# Patient Record
Sex: FEMALE | HISPANIC OR LATINO | Employment: UNEMPLOYED | URBAN - METROPOLITAN AREA
[De-identification: names, ages, dates, MRNs, and addresses within clinical notes are randomized per-mention and may not be internally consistent; named-entity substitution may affect disease eponyms.]

---

## 2019-08-22 ENCOUNTER — OFFICE VISIT (OUTPATIENT)
Dept: OBGYN | Facility: OTHER | Age: 44
End: 2019-08-22
Payer: COMMERCIAL

## 2019-08-22 VITALS
HEIGHT: 63 IN | WEIGHT: 140 LBS | HEART RATE: 64 BPM | DIASTOLIC BLOOD PRESSURE: 60 MMHG | BODY MASS INDEX: 24.8 KG/M2 | SYSTOLIC BLOOD PRESSURE: 110 MMHG

## 2019-08-22 DIAGNOSIS — Z80.49 FAMILY HISTORY OF MALIGNANT NEOPLASM OF ENDOMETRIUM: ICD-10-CM

## 2019-08-22 DIAGNOSIS — Z01.419 WELL FEMALE EXAM WITH ROUTINE GYNECOLOGICAL EXAM: Primary | ICD-10-CM

## 2019-08-22 DIAGNOSIS — Z12.4 ENCOUNTER FOR SCREENING FOR CERVICAL CANCER: ICD-10-CM

## 2019-08-22 DIAGNOSIS — Z12.31 VISIT FOR SCREENING MAMMOGRAM: ICD-10-CM

## 2019-08-22 PROCEDURE — 87624 HPV HI-RISK TYP POOLED RSLT: CPT | Performed by: OBSTETRICS & GYNECOLOGY

## 2019-08-22 PROCEDURE — 99386 PREV VISIT NEW AGE 40-64: CPT | Performed by: OBSTETRICS & GYNECOLOGY

## 2019-08-22 PROCEDURE — G0123 SCREEN CERV/VAG THIN LAYER: HCPCS | Performed by: OBSTETRICS & GYNECOLOGY

## 2019-08-22 ASSESSMENT — MIFFLIN-ST. JEOR: SCORE: 1267.17

## 2019-08-22 NOTE — PROGRESS NOTES
SUBJECTIVE:   CC: Kacie Chung is an 44 year old woman who presents for preventive health visit.     Healthy Habits:    Getting at least 3 servings of Calcium per day:  NO    Bi-annual eye exam:  Yes    Dental care twice a year:  Yes    Sleep apnea or symptoms of sleep apnea:  None    Diet:  Regular (no restrictions)    Frequency of exercise:  None    Duration of exercise:  N/A    Taking medications regularly:  Not Applicable    Barriers to taking medications:  Not applicable    Medication side effects:  Not applicable    PHQ-2 Total Score:    Additional concerns today:  Yes    Mammogram done on this date: 9/2018 (approximately), by this group: in [place], results were normal per patient.   Pap smear done on this date: 8/2018 (approximately), by this group: in [place], results were normal per patient.       Last menstrual period was irregular.  This was an isolate event.      Cholesterol testing was normal this year.     No history of GDM or GHTN.     Condoms for contraception     Mom with history of endometrial cancer diagnosed at age 66,      Today's PHQ-2 Score:   PHQ-2 ( 1999 Pfizer) 8/22/2019   Q1: Little interest or pleasure in doing things 0   Q2: Feeling down, depressed or hopeless 0   PHQ-2 Score 0   PHQ-2 Score Incomplete       Abuse: Current or Past(Physical, Sexual or Emotional)- No  Do you feel safe in your environment? Yes    Social History     Tobacco Use     Smoking status: Never Smoker     Smokeless tobacco: Never Used   Substance Use Topics     Alcohol use: Not Currently     If you drink alcohol do you typically have >3 drinks per day or >7 drinks per week? No    No flowsheet data found.    Labs reviewed in EPIC  BP Readings from Last 3 Encounters:   08/22/19 110/60    Wt Readings from Last 3 Encounters:   08/22/19 63.5 kg (140 lb)                  Patient Active Problem List   Diagnosis     Family history of malignant neoplasm of endometrium     Past Surgical History:   Procedure Laterality Date  "     SECTION         Social History     Tobacco Use     Smoking status: Never Smoker     Smokeless tobacco: Never Used   Substance Use Topics     Alcohol use: Not Currently     Family History   Problem Relation Age of Onset     Endometrial Cancer Mother      Hypertension Mother      Breast Cancer Maternal Grandmother          No current outpatient medications on file.     No Known Allergies  No lab results found.     Mammogram Screening: Patient under age 50, mutual decision reflected in health maintenance.    History of abnormal Pap smear: NO - age 30-65 PAP every 5 years with negative HPV co-testing recommended     Review of Systems  CONSTITUTIONAL: NEGATIVE for fever, chills, change in weight  INTEGUMENTARU/SKIN: NEGATIVE for worrisome rashes, moles or lesions  EYES: NEGATIVE for vision changes or irritation  ENT: NEGATIVE for ear, mouth and throat problems  RESP: NEGATIVE for significant cough or SOB  BREAST: NEGATIVE for masses, tenderness or discharge  CV: NEGATIVE for chest pain, palpitations or peripheral edema  GI: NEGATIVE for nausea, abdominal pain, heartburn, or change in bowel habits  : NEGATIVE for unusual urinary or vaginal symptoms. Periods are regular, aside from this recent one   MUSCULOSKELETAL: NEGATIVE for significant arthralgias or myalgia  NEURO: NEGATIVE for weakness, dizziness or paresthesias  PSYCHIATRIC: NEGATIVE for changes in mood or affect     OBJECTIVE:   /60 (BP Location: Right arm, Patient Position: Chair, Cuff Size: Adult Regular)   Pulse 64   Ht 1.605 m (5' 3.19\")   Wt 63.5 kg (140 lb)   LMP 08/15/2019   BMI 24.65 kg/m    Physical Exam  Gen: Alert and oriented times 3, no acute distress.  Well developed, well nourished, pleasant.    Neck: Supple, no masses.  No thyromegaly.  Breast: Symmetrical without lesions.  No dimpling, nipple discharge, or discrete masses.  No lymphadenopathy.  Chest:  Non labored.  Clear to auscultation bilaterally.    Heart: Regular, " "normal S1, S2.  No murmurs.   Abdomen: Soft, nontender, nondistended.  No hepatosplenomegaly.    :  Normal female external genitalia.  No lesions.  Urethral meatus normal.  Speculum exam reveals a normal vaginal vault, normal cervix .  No abnormal discharge.  Bimanual exam reveals a normal, mobile, nontender uterus .  No cervical motion tenderness.  Adnexa nontender with no palpable masses.    Extremities:  Nontender, no edema.    Pap obtained:  Yes     ASSESSMENT/PLAN:       ICD-10-CM    1. Well female exam with routine gynecological exam Z01.419    2. Visit for screening mammogram Z12.31 *MA Screening Digital Bilateral   3. Encounter for screening for cervical cancer  Z12.4    4. Family history of malignant neoplasm of endometrium Z80.49        Plan Pap today, then if normal, routine screening.      COUNSELING:  Reviewed preventive health counseling, as reflected in patient instructions    Estimated body mass index is 24.65 kg/m  as calculated from the following:    Height as of this encounter: 1.605 m (5' 3.19\").    Weight as of this encounter: 63.5 kg (140 lb).         reports that she has never smoked. She has never used smokeless tobacco.      Counseling Resources:  ATP IV Guidelines  Pooled Cohorts Equation Calculator  Breast Cancer Risk Calculator  FRAX Risk Assessment  ICSI Preventive Guidelines  Dietary Guidelines for Americans, 2010  USDA's MyPlate  ASA Prophylaxis  Lung CA Screening    Adri Galvin MD  [facility]  "

## 2019-08-22 NOTE — NURSING NOTE
"Chief Complaint   Patient presents with     Physical       Initial /60 (BP Location: Right arm, Patient Position: Chair, Cuff Size: Adult Regular)   Pulse 64   Ht 1.605 m (5' 3.19\")   Wt 63.5 kg (140 lb)   LMP 08/15/2019   BMI 24.65 kg/m   Estimated body mass index is 24.65 kg/m  as calculated from the following:    Height as of this encounter: 1.605 m (5' 3.19\").    Weight as of this encounter: 63.5 kg (140 lb).  BP completed using cuff size: regular    No obstetric history on file.    The following HM Due: NONE      The following patient reported/Care Every where data was sent to:  P ABSTRACT QUALITY INITIATIVES [[id number]]  Mammogram done on this date: 9/2018 (approximately), by this group: in [place], results were NIL.   Pap smear done on this date: 8/2018 (approximately), by this group: in [place], results were NIL.      n/a       Cheyanne Borrero, [facility]  August 22, 2019    "

## 2019-08-22 NOTE — LETTER
September 4, 2019    Kacie Chung  [address]   [place] [address]    Dear ,  This letter is regarding your recent Pap smear (cervical cancer screening) and Human Papillomavirus (HPV) test.  We are happy to inform you that your Pap smear result is normal. Cervical cancer is closely linked with certain types of HPV. Your results showed no evidence of high-risk HPV.  We recommend you have your next PAP smear and HPV test in 5 years.  You will still need to return to the clinic every year for an annual exam and other preventive tests.  If you have additional questions regarding this result, please call our registered nurse, Evangelina at [phone].  Sincerely,    Adri Galvin MD/cesar

## 2019-08-29 LAB
FINAL DIAGNOSIS: NORMAL
HPV HR 12 DNA CVX QL NAA+PROBE: NEGATIVE
HPV16 DNA SPEC QL NAA+PROBE: NEGATIVE
HPV18 DNA SPEC QL NAA+PROBE: NEGATIVE
SPECIMEN DESCRIPTION: NORMAL
SPECIMEN SOURCE CVX/VAG CYTO: NORMAL

## 2019-08-30 LAB
COPATH REPORT: NORMAL
PAP: NORMAL

## 2019-09-19 ENCOUNTER — ANCILLARY PROCEDURE (OUTPATIENT)
Dept: MAMMOGRAPHY | Facility: OTHER | Age: 44
End: 2019-09-19
Attending: OBSTETRICS & GYNECOLOGY
Payer: COMMERCIAL

## 2019-09-19 DIAGNOSIS — Z12.31 VISIT FOR SCREENING MAMMOGRAM: ICD-10-CM

## 2019-09-19 PROCEDURE — 77067 SCR MAMMO BI INCL CAD: CPT | Mod: TC

## 2020-10-21 DIAGNOSIS — Z12.31 VISIT FOR SCREENING MAMMOGRAM: ICD-10-CM

## 2021-01-03 ENCOUNTER — HEALTH MAINTENANCE LETTER (OUTPATIENT)
Age: 46
End: 2021-01-03

## 2021-08-30 ENCOUNTER — OFFICE VISIT (OUTPATIENT)
Dept: OBGYN | Facility: CLINIC | Age: 46
End: 2021-08-30
Payer: COMMERCIAL

## 2021-08-30 ENCOUNTER — ANCILLARY PROCEDURE (OUTPATIENT)
Dept: ULTRASOUND IMAGING | Facility: CLINIC | Age: 46
End: 2021-08-30
Attending: OBSTETRICS & GYNECOLOGY
Payer: COMMERCIAL

## 2021-08-30 VITALS
HEART RATE: 58 BPM | SYSTOLIC BLOOD PRESSURE: 121 MMHG | DIASTOLIC BLOOD PRESSURE: 81 MMHG | WEIGHT: 140.3 LBS | BODY MASS INDEX: 24.7 KG/M2 | OXYGEN SATURATION: 100 %

## 2021-08-30 DIAGNOSIS — N93.9 ABNORMAL UTERINE BLEEDING (AUB): ICD-10-CM

## 2021-08-30 DIAGNOSIS — N93.9 ABNORMAL UTERINE BLEEDING (AUB): Primary | ICD-10-CM

## 2021-08-30 LAB
HGB BLD-MCNC: 12.7 G/DL (ref 11.7–15.7)
PROLACTIN SERPL-MCNC: 15 UG/L (ref 3–27)
TSH SERPL DL<=0.005 MIU/L-ACNC: 1.53 MU/L (ref 0.4–4)

## 2021-08-30 PROCEDURE — 84146 ASSAY OF PROLACTIN: CPT | Performed by: OBSTETRICS & GYNECOLOGY

## 2021-08-30 PROCEDURE — 76856 US EXAM PELVIC COMPLETE: CPT | Mod: 59 | Performed by: RADIOLOGY

## 2021-08-30 PROCEDURE — 99213 OFFICE O/P EST LOW 20 MIN: CPT | Performed by: OBSTETRICS & GYNECOLOGY

## 2021-08-30 PROCEDURE — 76830 TRANSVAGINAL US NON-OB: CPT | Mod: GC | Performed by: RADIOLOGY

## 2021-08-30 PROCEDURE — 85018 HEMOGLOBIN: CPT | Performed by: OBSTETRICS & GYNECOLOGY

## 2021-08-30 PROCEDURE — 36415 COLL VENOUS BLD VENIPUNCTURE: CPT | Performed by: OBSTETRICS & GYNECOLOGY

## 2021-08-30 PROCEDURE — 84443 ASSAY THYROID STIM HORMONE: CPT | Performed by: OBSTETRICS & GYNECOLOGY

## 2021-08-30 NOTE — PROGRESS NOTES
OB/GYN       NAME:  Kacie Chung  PCP:  No Ref-Primary, Physician  MRN:  [id number]      Impression / Plan     46 year old  with:      ICD-10-CM    1. Abnormal uterine bleeding (AUB)  N93.9 US Pelvic Complete with Transvaginal     TSH with free T4 reflex     Prolactin     Hemoglobin     Hemoglobin     Prolactin     TSH with free T4 reflex       Plan to start with imaging and labs.  Exam is normal today.  Family history of uterine cancer, so may need endometrial sampling.  Most likely etiology for her symptoms is perimenopause.  I will call her with the results and follow-up recommendations.     used for the history, but not the exam.  Difficulty with the  and patient speaks English fairly well.        Chief Complaint     Chief Complaint   Patient presents with     Abnormal Uterine Bleeding       HPI     Kacie Chung is a  46 year old female who is seen for irregular periods.     Patient's last menstrual period was 2021 (exact date).     Since the month of May she have been having irregular periods.  Periods are lasting 5 days.  She is having bleeding between periods, but not every month.      Mom with history of endometrial cancer diagnosed at age 66.    Condoms for contraception    She is otherwise feeling well.  She typically has regular periods.  No abnormal discharge or pelvic pain.    Problem List     Patient Active Problem List    Diagnosis Date Noted     Family history of malignant neoplasm of endometrium 2019     Priority: Medium       Medications     No current outpatient medications on file.     No current facility-administered medications for this visit.        Allergies     No Known Allergies    Past Medical/Surgical History     History reviewed. No pertinent past medical history.    Past Surgical History:   Procedure Laterality Date      SECTION          Social History     Social History     Socioeconomic History     Marital status:       Spouse name: Not on file     Number of children: Not on file     Years of education: Not on file     Highest education level: Not on file   Occupational History     Not on file   Tobacco Use     Smoking status: Never Smoker     Smokeless tobacco: Never Used   Vaping Use     Vaping Use: Never used   Substance and Sexual Activity     Alcohol use: Not Currently     Drug use: Never     Sexual activity: Yes     Partners: Male     Birth control/protection: Condom   Other Topics Concern     Not on file   Social History Narrative     Not on file     Social Determinants of Health     Financial Resource Strain:      Difficulty of Paying Living Expenses:    Food Insecurity:      Worried About Running Out of Food in the Last Year:      Ran Out of Food in the Last Year:    Transportation Needs:      Lack of Transportation (Medical):      Lack of Transportation (Non-Medical):    Physical Activity:      Days of Exercise per Week:      Minutes of Exercise per Session:    Stress:      Feeling of Stress :    Social Connections:      Frequency of Communication with Friends and Family:      Frequency of Social Gatherings with Friends and Family:      Attends Confucianism Services:      Active Member of Clubs or Organizations:      Attends Club or Organization Meetings:      Marital Status:    Intimate Partner Violence:      Fear of Current or Ex-Partner:      Emotionally Abused:      Physically Abused:      Sexually Abused:        Family History      Family History   Problem Relation Age of Onset     Endometrial Cancer Mother      Hypertension Mother      Breast Cancer Maternal Grandmother        ROS     A /GI organ review of systems was asked and the pertinent positives and negatives are listed in the HPI.     Physical Exam   Vitals: /81 (BP Location: Right arm, Cuff Size: Adult Regular)   Pulse 58   Wt 63.6 kg (140 lb 4.8 oz)   LMP 08/08/2021 (Exact Date)   SpO2 100%   Breastfeeding No   BMI 24.70 kg/m      General:  Comfortable, no obvious distress  Psych: Alert. Appropriate affect,.  Normal speech.   : Normal female external genitalia.  No lesions.  Urethral meatus normal.  Speculum exam reveals a normal vaginal vault, normal cervix, towards the patient's right.  No lesions.  No abnormal discharge aside from a small amount of brown discharge.  Bimanual exam reveals a normal, mobile, nontender uterus.  No cervical motion tenderness.  Adnexa nontender with no palpable masses.         20 min spent on the date of the encounter in chart review, patient visit, review of tests, documentation   about the issues documented above.     Adri Galvin MD

## 2021-09-24 ENCOUNTER — OFFICE VISIT (OUTPATIENT)
Dept: OBGYN | Facility: CLINIC | Age: 46
End: 2021-09-24
Payer: COMMERCIAL

## 2021-09-24 VITALS
HEART RATE: 68 BPM | BODY MASS INDEX: 24.69 KG/M2 | OXYGEN SATURATION: 100 % | WEIGHT: 140.2 LBS | DIASTOLIC BLOOD PRESSURE: 69 MMHG | SYSTOLIC BLOOD PRESSURE: 116 MMHG

## 2021-09-24 DIAGNOSIS — Z30.011 ENCOUNTER FOR INITIAL PRESCRIPTION OF CONTRACEPTIVE PILLS: ICD-10-CM

## 2021-09-24 DIAGNOSIS — N93.9 ABNORMAL UTERINE BLEEDING (AUB): Primary | ICD-10-CM

## 2021-09-24 PROCEDURE — 99214 OFFICE O/P EST MOD 30 MIN: CPT | Performed by: OBSTETRICS & GYNECOLOGY

## 2021-09-24 RX ORDER — NORETHINDRONE ACETATE AND ETHINYL ESTRADIOL 1MG-20(21)
1 KIT ORAL DAILY
Qty: 84 TABLET | Refills: 4 | Status: SHIPPED | OUTPATIENT
Start: 2021-09-24 | End: 2022-08-02

## 2021-09-24 NOTE — PROGRESS NOTES
OB/GYN      NAME:  Kacie Chung  PCP:  No Ref-Primary, Physician  MRN:  [id number]    Impression / Plan     46 year old  with:      ICD-10-CM    1. Abnormal uterine bleeding (AUB)  N93.9    2. Encounter for initial prescription of contraceptive pills  Z30.011 norethindrone-ethinyl estradiol (JUNEL FE 1/20) 1-20 MG-MCG tablet     Discussed management options for her irregular bleeding and also reviewed the ultrasound images.  Patient has a family history of endometrial cancer, but the lining of the uterus is 2 mm on ultrasound, so risk of hyperplasia or malignancy at this time is very low.    Also reviewed adenomyosis, which is suggested on ultrasound.  She is not having painful periods, just light irregular bleeding.  Discussed medical versus surgical options.  Patient would like to try birth control pill.  We also discussed the Mirena IUD in the event that the pill is not helpful.  The pill should reduce her risk of endometrial cancer and help regulate her cycle.  It can also help with acne.  She will follow-up with me as needed.    Chief Complaint     Chief Complaint   Patient presents with     Follow Up       HPI     Kacie Chung is a  46 year old female who is seen for follow-up.    Patient's last menstrual period was 09/10/2021 (exact date).     Patient has been having light, irregular cycles.  No pain.  No prolonged or heavy bleeding.  No new concerns since I saw her last.    She is here with her  who helps with interpretation, although the patient understands and speaks English fairly well.    Problem List     Patient Active Problem List    Diagnosis Date Noted     Family history of malignant neoplasm of endometrium 2019     Priority: Medium       Medications     No current outpatient medications on file.     No current facility-administered medications for this visit.        Allergies     No Known Allergies    ROS     A  organ review of systems was asked and the pertinent  positives and negatives are listed in the HPI.     Physical Exam   Vitals: /69 (BP Location: Right arm, Cuff Size: Adult Regular)   Pulse 68   Wt 63.6 kg (140 lb 3.2 oz)   LMP 09/10/2021 (Exact Date)   SpO2 100%   Breastfeeding No   BMI 24.69 kg/m      General: Comfortable, no obvious distress,        Labs/Imaging       I have personally reviewed the labs/imaging and the findings were:    Ultrasound 8/30/2021    Uterus 6.9 x 3.6 x 4.7 cm.  Mildly heterogenous appearance concerning for adenomyosis    Endometrium 2 mm    Normal ovaries bilaterally.  No free fluid or adnexal masses.    Component      Latest Ref Rng & Units 8/30/2021   Hemoglobin      11.7 - 15.7 g/dL 12.7   Prolactin      3 - 27 ug/L 15   TSH      0.40 - 4.00 mU/L 1.53       Reviewed labs and interpreted ultrasound.  Prescription drug management.    Adri Galvin MD

## 2021-10-10 ENCOUNTER — HEALTH MAINTENANCE LETTER (OUTPATIENT)
Age: 46
End: 2021-10-10

## 2021-11-02 ENCOUNTER — ANCILLARY PROCEDURE (OUTPATIENT)
Dept: MAMMOGRAPHY | Facility: OTHER | Age: 46
End: 2021-11-02
Payer: COMMERCIAL

## 2021-11-02 DIAGNOSIS — Z12.31 VISIT FOR SCREENING MAMMOGRAM: ICD-10-CM

## 2021-11-02 PROCEDURE — 77067 SCR MAMMO BI INCL CAD: CPT | Mod: TC | Performed by: RADIOLOGY

## 2022-01-30 ENCOUNTER — HEALTH MAINTENANCE LETTER (OUTPATIENT)
Age: 47
End: 2022-01-30

## 2022-05-21 ENCOUNTER — HEALTH MAINTENANCE LETTER (OUTPATIENT)
Age: 47
End: 2022-05-21

## 2022-08-02 DIAGNOSIS — Z30.011 ENCOUNTER FOR INITIAL PRESCRIPTION OF CONTRACEPTIVE PILLS: ICD-10-CM

## 2022-08-02 RX ORDER — NORETHINDRONE ACETATE AND ETHINYL ESTRADIOL 1MG-20(21)
1 KIT ORAL DAILY
Qty: 84 TABLET | Refills: 0 | Status: SHIPPED | OUTPATIENT
Start: 2022-08-02 | End: 2022-11-14

## 2022-08-02 NOTE — TELEPHONE ENCOUNTER
"Requested Prescriptions   Pending Prescriptions Disp Refills     norethindrone-ethinyl estradiol (JUNEL FE 1/20) 1-20 MG-MCG tablet 84 tablet 4     Sig: Take 1 tablet by mouth daily       Contraceptives Protocol Passed - 8/2/2022 10:17 AM        Passed - Patient is not a current smoker if age is 35 or older        Passed - Recent (12 mo) or future (30 days) visit within the authorizing provider's specialty     Patient has had an office visit with the authorizing provider or a provider within the authorizing providers department within the previous 12 mos or has a future within next 30 days. See \"Patient Info\" tab in inbasket, or \"Choose Columns\" in Meds & Orders section of the refill encounter.              Passed - Medication is active on med list        Passed - No active pregnancy on record        Passed - No positive pregnancy test in past 12 months           Pt last seen 9/24/2021 for AUB    Last prescribed 9/24/201 for 84 tablets with 4 refills    Prescription approved per [facility] Refill Protocol.    RN called pt using  services and relayed stanley refill sent in to get her to her appt on 9/19, can follow up then with Dr. Galvin if plan to continue on medication or different plan.    Patient verbalized understanding and agreed to plan.     Estrella Ellis RN on 8/2/2022 at 10:29 AM    "

## 2022-08-02 NOTE — TELEPHONE ENCOUNTER
Patient is scheduled for an annual exam on 9/19/2022, but will be running out of her birth control at the end of the month.  She was not sure if she was needing to continue or what the plan would be.    Cheyanne Borrero, [facility]  August 2, 2022

## 2022-09-18 ENCOUNTER — HEALTH MAINTENANCE LETTER (OUTPATIENT)
Age: 47
End: 2022-09-18

## 2022-10-22 DIAGNOSIS — Z30.011 ENCOUNTER FOR INITIAL PRESCRIPTION OF CONTRACEPTIVE PILLS: ICD-10-CM

## 2022-11-10 ENCOUNTER — ANCILLARY PROCEDURE (OUTPATIENT)
Dept: MAMMOGRAPHY | Facility: OTHER | Age: 47
End: 2022-11-10
Payer: COMMERCIAL

## 2022-11-10 DIAGNOSIS — Z12.31 VISIT FOR SCREENING MAMMOGRAM: ICD-10-CM

## 2022-11-10 PROCEDURE — 77063 BREAST TOMOSYNTHESIS BI: CPT | Mod: TC | Performed by: RADIOLOGY

## 2022-11-10 PROCEDURE — 77067 SCR MAMMO BI INCL CAD: CPT | Mod: TC | Performed by: RADIOLOGY

## 2022-11-10 NOTE — PATIENT INSTRUCTIONS
We recommend screening for HIV and Hepatitis C once in your lifetime.  If you would like this, please let us know for your next visit.  It should be covered by insurance, but you can double check to make sure.               If you have labs or imaging done, the results will automatically release in [employer] without an interpretation.  Your health care professional will review those results and send an interpretation with recommendations as soon as possible, but this may be 1-3 business days.    If you have any questions regarding your visit, please contact your care team.     [employer]: [phone]  Women s Health CLINIC HOURS TELEPHONE NUMBER       Adri Galvin MD  Assistant Medical Director    Beatrice - Certified Medical Assistant     Estrella- SHABNAM RN  Kia-ESEQUIEL Mcdaniel-ESEQUIEL Garcia-  Fátima-     Monday- [place]  8:00 a.m - 5:00 p.m    Tuesday- [facility]        [facility]  8[facility]00 [facility] - 5:00 p.m.    Friday- [place]  7:30 a.m - 4:00 p.m. [facility]  [address]  [place] [address]  [phone] Fax  Imaging Scheduling [phone]    [facility]  [address] [place] [address] [address]  [place], [place] [address] [phone]    [address] [address] [phone] Fax  Imaging Scheduling [phone]     Urgent Care locations:  [facility] Monday-Friday  10 am - 8 pm  Saturday and Sunday   9 am - 5 pm  Monday-Friday   10 am - 8 pm  Saturday and Sunday   9 am - 5 pm   [phone]     **Surgeries** Our Surgery Schedulers will contact you to schedule. If you do not receive a call within 3 business days, please call [phone].    If you need a medication refill, please contact your pharmacy. Please allow 3 business days for your refill to be completed.    As always, thank you for trusting us with your healthcare needs!

## 2022-11-13 ASSESSMENT — ENCOUNTER SYMPTOMS
BREAST MASS: 0
HEARTBURN: 0
SHORTNESS OF BREATH: 0
COUGH: 0
PALPITATIONS: 0
DYSURIA: 0
NAUSEA: 0
FREQUENCY: 0
HEMATOCHEZIA: 0
DIZZINESS: 0
CHILLS: 0
CONSTIPATION: 0
WEAKNESS: 0
JOINT SWELLING: 0
HEMATURIA: 0
DIARRHEA: 0
PARESTHESIAS: 0
FEVER: 0
MYALGIAS: 0
EYE PAIN: 0
ARTHRALGIAS: 0
SORE THROAT: 0
ABDOMINAL PAIN: 0
HEADACHES: 0
NERVOUS/ANXIOUS: 0

## 2022-11-14 ENCOUNTER — OFFICE VISIT (OUTPATIENT)
Dept: OBGYN | Facility: CLINIC | Age: 47
End: 2022-11-14
Payer: COMMERCIAL

## 2022-11-14 VITALS
HEIGHT: 63 IN | WEIGHT: 148 LBS | HEART RATE: 70 BPM | DIASTOLIC BLOOD PRESSURE: 84 MMHG | SYSTOLIC BLOOD PRESSURE: 125 MMHG | BODY MASS INDEX: 26.22 KG/M2

## 2022-11-14 DIAGNOSIS — Z13.6 CARDIOVASCULAR SCREENING; LDL GOAL LESS THAN 160: ICD-10-CM

## 2022-11-14 DIAGNOSIS — Z01.419 WELL FEMALE EXAM WITH ROUTINE GYNECOLOGICAL EXAM: Primary | ICD-10-CM

## 2022-11-14 PROCEDURE — 99396 PREV VISIT EST AGE 40-64: CPT | Performed by: OBSTETRICS & GYNECOLOGY

## 2022-11-14 RX ORDER — NORETHINDRONE ACETATE AND ETHINYL ESTRADIOL 1MG-20(21)
KIT ORAL
Qty: 84 TABLET | Refills: 4 | Status: SHIPPED | OUTPATIENT
Start: 2022-11-14 | End: 2023-08-14

## 2022-11-14 ASSESSMENT — ENCOUNTER SYMPTOMS
DIZZINESS: 0
FREQUENCY: 0
SORE THROAT: 0
HEMATURIA: 0
HEADACHES: 0
COUGH: 0
NAUSEA: 0
EYE PAIN: 0
DIARRHEA: 0
CHILLS: 0
HEMATOCHEZIA: 0
MYALGIAS: 0
FEVER: 0
NERVOUS/ANXIOUS: 0
DYSURIA: 0
ABDOMINAL PAIN: 0
PALPITATIONS: 0
CONSTIPATION: 0
PARESTHESIAS: 0
SHORTNESS OF BREATH: 0
JOINT SWELLING: 0
ARTHRALGIAS: 0
BREAST MASS: 0
HEARTBURN: 0
WEAKNESS: 0

## 2022-11-14 NOTE — PROGRESS NOTES
SUBJECTIVE:   CC: Kacie Chung is an 47 year old who presents for preventive health visit.       Patient has been advised of split billing requirements and indicates understanding: Yes  Healthy Habits:     Getting at least 3 servings of Calcium per day:  Yes    Bi-annual eye exam:  Yes    Dental care twice a year:  Yes    Sleep apnea or symptoms of sleep apnea:  None    Diet:  Regular (no restrictions)    Frequency of exercise:  2-3 days/week    Duration of exercise:  30-45 minutes    Taking medications regularly:  Yes    Medication side effects:  None    PHQ-2 Total Score: 0    Additional concerns today:  No    .  No history of GDM or GHTN.     I last saw the patient a year ago in September for abnormal bleeding.  Family history of endometrial cancer (Mom, dx age 66).  Adenomyosis has been suggested on ultrasound.  She was started on the birth control pill at that time.  She has some occasional bloating but otherwise does well       Today's PHQ-2 Score:   PHQ-2 (  [employer]) 2022   Q1: Little interest or pleasure in doing things 0   Q2: Feeling down, depressed or hopeless 0   PHQ-2 Score 0   PHQ-2 Total Score (12-17 Years)- Positive if 3 or more points; Administer PHQ-A if positive -   Q1: Little interest or pleasure in doing things Not at all   Q2: Feeling down, depressed or hopeless Not at all   PHQ-2 Score 0       Abuse: Current or Past (Physical, Sexual or Emotional) - No  Do you feel safe in your environment? Yes        Social History     Tobacco Use     Smoking status: Never     Smokeless tobacco: Never   Substance Use Topics     Alcohol use: Not Currently     If you drink alcohol do you typically have >3 drinks per day or >7 drinks per week? No    No flowsheet data found.       BP Readings from Last 3 Encounters:   22 125/84   21 116/69   21 121/81    Wt Readings from Last 3 Encounters:   22 67.1 kg (148 lb)   21 63.6 kg (140 lb 3.2 oz)   21 63.6 kg (140 lb  4.8 oz)                  Patient Active Problem List   Diagnosis     Family history of malignant neoplasm of endometrium     Past Surgical History:   Procedure Laterality Date      SECTION         Social History     Tobacco Use     Smoking status: Never     Smokeless tobacco: Never   Substance Use Topics     Alcohol use: Not Currently     Family History   Problem Relation Age of Onset     Endometrial Cancer Mother      Hypertension Mother      Breast Cancer Maternal Grandmother          Current Outpatient Medications   Medication Sig Dispense Refill     BLISOVI FE  1-20 MG-MCG tablet TAKE 1 TABLET BY MOUTH DAILY 84 tablet 4     No Known Allergies  Recent Labs   Lab Test 21  0906   TSH 1.53        Breast Cancer Screening:  Any new diagnosis of family breast, ovarian, or bowel cancer? No    FHS-7:   Breast CA Risk Assessment (FHS-7) 2021 11/10/2022   Did any of your first-degree relatives have breast or ovarian cancer? No No   Did any of your relatives have bilateral breast cancer? No No   Did any man in your family have breast cancer? No No   Did any woman in your family have breast and ovarian cancer? No No   Did any woman in your family have breast cancer before age 50 y? No No   Do you have 2 or more relatives with breast and/or ovarian cancer? No No   Do you have 2 or more relatives with breast and/or bowel cancer? No No      Mammogram Screening: Recommended annual mammography  Pertinent mammograms are reviewed under the imaging tab.    History of abnormal Pap smear: NO - age 30-65 PAP every 5 years with negative HPV co-testing recommended  PAP / HPV Latest Ref Rng & Units 2019   PAP (Historical) - NIL   HPV16 NEG:Negative Negative   HPV18 NEG:Negative Negative   HRHPV NEG:Negative Negative        Review of Systems   Constitutional: Negative for chills and fever.   HENT: Negative for congestion, ear pain, hearing loss and sore throat.    Eyes: Negative for pain and visual disturbance.  "  Respiratory: Negative for cough and shortness of breath.    Cardiovascular: Negative for chest pain, palpitations and peripheral edema.   Gastrointestinal: Negative for abdominal pain, constipation, diarrhea, heartburn, hematochezia and nausea.   Breasts:  Negative for tenderness, breast mass and discharge.   Genitourinary: Negative for dysuria, frequency, genital sores, hematuria, pelvic pain, urgency, vaginal bleeding and vaginal discharge.   Musculoskeletal: Negative for arthralgias, joint swelling and myalgias.   Skin: Negative for rash.   Neurological: Negative for dizziness, weakness, headaches and paresthesias.   Psychiatric/Behavioral: Negative for mood changes. The patient is not nervous/anxious.        OBJECTIVE:   /84 (BP Location: Right arm, Cuff Size: Adult Regular)   Pulse 70   Ht 1.605 m (5' 3.2\")   Wt 67.1 kg (148 lb)   LMP 10/24/2022 (Approximate)   BMI 26.05 kg/m    Physical Exam  Gen: Alert and oriented times 3, no acute distress.  Well developed, well nourished, pleasant.    Neck: Supple, no masses.  No thyromegaly.  Breast: Symmetrical without lesions.  No dimpling, nipple discharge, or discrete masses.  No lymphadenopathy.  Chest:  Non labored.  Clear to auscultation bilaterally.    Heart: Regular, normal S1, S2.  No murmurs.   Abdomen: Soft, nontender, nondistended.  No hepatosplenomegaly.    :  Normal female external genitalia.  No lesions.  Urethral meatus normal.  Speculum exam reveals a normal vaginal vault, normal cervix .  No abnormal discharge.  Bimanual exam reveals a normal, mobile, nontender uterus .  No cervical motion tenderness.  Adnexa nontender with no palpable masses.    Extremities:  Nontender, no edema.       ASSESSMENT/PLAN:       ICD-10-CM    1. Well female exam with routine gynecological exam  Z01.419 Glucose      2. CARDIOVASCULAR SCREENING; LDL GOAL LESS THAN 160  Z13.6 Lipid panel reflex to direct LDL Non-fasting          Refill sent in.  Follow up in one " "year.  She would like to hold of [facility] for now.      COUNSELING:  Reviewed preventive health counseling, as reflected in patient instructions    Estimated body mass index is 26.05 kg/m  as calculated from the following:    Height as of this encounter: 1.605 m (5' 3.2\").    Weight as of this encounter: 67.1 kg (148 lb).        She reports that she has never smoked. She has never used smokeless tobacco.      Counseling Resources:  ATP IV Guidelines  Pooled Cohorts Equation Calculator  Breast Cancer Risk Calculator  BRCA-Related Cancer Risk Assessment: FHS-7 Tool  FRAX Risk Assessment  ICSI Preventive Guidelines  Dietary Guidelines for Americans, 2010  USDA's MyPlate  ASA Prophylaxis  Lung CA Screening    Adri Galvin MD  [facility]"

## 2023-08-14 ENCOUNTER — OFFICE VISIT (OUTPATIENT)
Dept: OBGYN | Facility: CLINIC | Age: 48
End: 2023-08-14
Payer: COMMERCIAL

## 2023-08-14 VITALS
HEART RATE: 74 BPM | BODY MASS INDEX: 26.79 KG/M2 | DIASTOLIC BLOOD PRESSURE: 72 MMHG | WEIGHT: 151.2 LBS | HEIGHT: 63 IN | SYSTOLIC BLOOD PRESSURE: 136 MMHG

## 2023-08-14 DIAGNOSIS — N80.03 ADENOMYOSIS: ICD-10-CM

## 2023-08-14 DIAGNOSIS — N93.9 ABNORMAL UTERINE BLEEDING (AUB): Primary | ICD-10-CM

## 2023-08-14 PROCEDURE — 99214 OFFICE O/P EST MOD 30 MIN: CPT | Performed by: OBSTETRICS & GYNECOLOGY

## 2023-08-14 RX ORDER — NORGESTIMATE AND ETHINYL ESTRADIOL 0.25-0.035
1 KIT ORAL DAILY
Qty: 84 TABLET | Refills: 3 | Status: SHIPPED | OUTPATIENT
Start: 2023-08-14

## 2023-08-14 NOTE — PROGRESS NOTES
OB/GYN       NAME:  Kacie Chung  PCP:  No Ref-Primary, Physician  MRN:  [id number]    Impression / Plan     48 year old  with:      ICD-10-CM    1. Abnormal uterine bleeding (AUB)  N93.9 norgestimate-ethinyl estradiol (ORTHO-CYCLEN) 0.25-35 MG-MCG tablet      2. Adenomyosis  N80.03           Discussed management options.  Plan to hold off on repeat labs, ultrasound, and biopsy for now because her periods are light and short.  If that changes, or if the bleeding becomes more frequent, we will consider a biopsy.  Patient opts for a change in the birth control pills, so we will change from Loestrin to Sprintec.  Instructions and precautions given.  She was also given a handout for the Kyleena.  She may also consider the Mirena if her periods are heavier.      Chief Complaint     Chief Complaint   Patient presents with    Consult       HPI     Kacie Chung is a  48 year old female who is seen for follow up abnormal uterine bleeding.      Family history of endometrial cancer (Mom, dx age 66).  Adenomyosis has been suggested on prior ultrasound.  She has used JHON for management.   She has some bleeding between periods.  Sometimes she does not bleed when she should with the birth control pack.  Bleeding is always light.  Generally lasts 4-5 days.    Prior to starting the birth control pills, her periods were heavier, but not really heavy.  Her main concern was that they were more frequent.    Patient's last menstrual period was 2023 (approximate).     Problem List     Patient Active Problem List    Diagnosis Date Noted    Abnormal uterine bleeding (AUB) 2023     Priority: Medium    Adenomyosis 2023     Priority: Medium    Family history of malignant neoplasm of endometrium 2019     Priority: Medium       Medications     Current Outpatient Medications   Medication    norgestimate-ethinyl estradiol (ORTHO-CYCLEN) 0.25-35 MG-MCG tablet     No current facility-administered medications for  "this visit.        Allergies     No Known Allergies    Past Medical/Surgical History     History reviewed. No pertinent past medical history.    Past Surgical History:   Procedure Laterality Date     SECTION          Social History     Social History     Socioeconomic History    Marital status:      Spouse name: Not on file    Number of children: Not on file    Years of education: Not on file    Highest education level: Not on file   Occupational History    Not on file   Tobacco Use    Smoking status: Never    Smokeless tobacco: Never   Vaping Use    Vaping Use: Never used   Substance and Sexual Activity    Alcohol use: Not Currently    Drug use: Never    Sexual activity: Yes     Partners: Male     Birth control/protection: Condom   Other Topics Concern    Not on file   Social History Narrative    Not on file     Social Determinants of Health     Financial Resource Strain: Not on file   Food Insecurity: Not on file   Transportation Needs: Not on file   Physical Activity: Not on file   Stress: Not on file   Social Connections: Not on file   Intimate Partner Violence: Not on file   Housing Stability: Not on file       Family History      Family History   Problem Relation Age of Onset    Endometrial Cancer Mother     Hypertension Mother     Breast Cancer Maternal Grandmother        ROS     Pertinent positives and negatives are listed in the HPI.     Physical Exam   Vitals: /72   Pulse 74   Ht 1.605 m (5' 3.2\")   Wt 68.6 kg (151 lb 3.2 oz)   LMP 2023 (Approximate)   BMI 26.61 kg/m      General: Comfortable, no obvious distress, normal body habitus  Psych: Alert. Appropriate affect,.  Normal speech.   : Deferred    Labs/Imaging       I have personally reviewed the labs/imaging and findings were:    Component      Latest Ref Rng 2021  9:06 AM   Prolactin      3 - 27 ug/L 15    TSH      0.40 - 4.00 mU/L 1.53          Ultrasound 2021  Uterus 6.9 x 3.6 x 4.7 cm.  Mildly " heterogenous appearance concerning for adenomyosis  Endometrium 2 mm  Normal ovaries bilaterally.  No free fluid or adnexal masses.         Adri Galvin MD       Side effects of treatment (breakthrough bleeding on birth control pills), prescription medication management

## 2023-08-14 NOTE — PATIENT INSTRUCTIONS
If you have labs or imaging done, the results will automatically release in [facility] without an interpretation.  Your health care professional will review those results and send an interpretation with recommendations as soon as possible, but this may be 1-3 business days.    If you have any questions regarding your visit, please contact your care team.     [employer] Services: [phone]  [facility] CLINIC HOURS TELEPHONE NUMBER       MD Beatrice Gonzalez - Certified Medical Assistant     Estrella- LEAD RN  Kia-ESEQUIEL Ceron-ESEQUIEL Garcia-  Fátima-     Monday- [place]  8:00 a.m - 5:00 p.m    Tuesday- Surgery        Thursday- [place]  8:00 a.m - 5:00 p.m.    Friday- [place]  7:30 a.m - 4:00 p.m. [facility]  [address]  [place] [address]  [phone] Fax  Imaging Scheduling [phone]    [facility]  [address] [place] [address] Dr.  [place], [place] [address] [phone]    [facility]  [address], [place] [address] [phone] Fax  Imaging Scheduling [phone]     Urgent Care locations:  [facility] Monday-Friday  10 am - 8 pm  Saturday and Sunday   9 am - 5 pm  Monday-Friday   10 am - 8 pm  Saturday and Sunday   9 am - 5 pm   [phone]     **Surgeries** Our Surgery Schedulers will contact you to schedule. If you do not receive a call within 3 business days, please call [phone].    If you need a medication refill, please contact your pharmacy. Please allow 3 business days for your refill to be completed.    As always, thank you for trusting us with your healthcare needs!

## 2023-11-21 ENCOUNTER — ANCILLARY PROCEDURE (OUTPATIENT)
Dept: MAMMOGRAPHY | Facility: CLINIC | Age: 48
End: 2023-11-21
Payer: COMMERCIAL

## 2023-11-21 DIAGNOSIS — Z12.31 VISIT FOR SCREENING MAMMOGRAM: ICD-10-CM

## 2023-11-21 PROCEDURE — 77063 BREAST TOMOSYNTHESIS BI: CPT | Performed by: STUDENT IN AN ORGANIZED HEALTH CARE EDUCATION/TRAINING PROGRAM

## 2023-11-21 PROCEDURE — 77067 SCR MAMMO BI INCL CAD: CPT | Performed by: STUDENT IN AN ORGANIZED HEALTH CARE EDUCATION/TRAINING PROGRAM

## 2023-12-17 ENCOUNTER — HEALTH MAINTENANCE LETTER (OUTPATIENT)
Age: 48
End: 2023-12-17

## 2024-07-28 DIAGNOSIS — N93.9 ABNORMAL UTERINE BLEEDING (AUB): ICD-10-CM

## 2024-08-10 ENCOUNTER — MYC MEDICAL ADVICE (OUTPATIENT)
Dept: OBGYN | Facility: CLINIC | Age: 49
End: 2024-08-10
Payer: COMMERCIAL

## 2024-10-26 RX ORDER — NORGESTIMATE AND ETHINYL ESTRADIOL 0.25-0.035
1 KIT ORAL DAILY
Qty: 84 TABLET | Refills: 3 | OUTPATIENT
Start: 2024-10-26

## 2024-11-04 ENCOUNTER — OFFICE VISIT (OUTPATIENT)
Dept: OBGYN | Facility: CLINIC | Age: 49
End: 2024-11-04
Attending: OBSTETRICS & GYNECOLOGY
Payer: COMMERCIAL

## 2024-11-04 VITALS
BODY MASS INDEX: 27.12 KG/M2 | HEIGHT: 63 IN | DIASTOLIC BLOOD PRESSURE: 71 MMHG | WEIGHT: 153.1 LBS | SYSTOLIC BLOOD PRESSURE: 108 MMHG

## 2024-11-04 DIAGNOSIS — Z12.4 ENCOUNTER FOR SCREENING FOR CERVICAL CANCER: ICD-10-CM

## 2024-11-04 DIAGNOSIS — Z01.419 WELL FEMALE EXAM WITH ROUTINE GYNECOLOGICAL EXAM: Primary | ICD-10-CM

## 2024-11-04 DIAGNOSIS — Z12.11 COLON CANCER SCREENING: ICD-10-CM

## 2024-11-04 DIAGNOSIS — Z12.31 VISIT FOR SCREENING MAMMOGRAM: ICD-10-CM

## 2024-11-04 LAB
CHOLEST SERPL-MCNC: 208 MG/DL
FASTING STATUS PATIENT QL REPORTED: YES
FASTING STATUS PATIENT QL REPORTED: YES
GLUCOSE SERPL-MCNC: 92 MG/DL (ref 70–99)
HDLC SERPL-MCNC: 72 MG/DL
HPV HR 12 DNA CVX QL NAA+PROBE: NEGATIVE
HPV16 DNA CVX QL NAA+PROBE: NEGATIVE
HPV18 DNA CVX QL NAA+PROBE: NEGATIVE
HUMAN PAPILLOMA VIRUS FINAL DIAGNOSIS: NORMAL
LDLC SERPL CALC-MCNC: 120 MG/DL
NONHDLC SERPL-MCNC: 136 MG/DL
TRIGL SERPL-MCNC: 80 MG/DL

## 2024-11-04 PROCEDURE — 87624 HPV HI-RISK TYP POOLED RSLT: CPT | Performed by: OBSTETRICS & GYNECOLOGY

## 2024-11-04 PROCEDURE — 99459 PELVIC EXAMINATION: CPT | Performed by: OBSTETRICS & GYNECOLOGY

## 2024-11-04 PROCEDURE — G0145 SCR C/V CYTO,THINLAYER,RESCR: HCPCS | Performed by: OBSTETRICS & GYNECOLOGY

## 2024-11-04 PROCEDURE — 36415 COLL VENOUS BLD VENIPUNCTURE: CPT | Performed by: OBSTETRICS & GYNECOLOGY

## 2024-11-04 PROCEDURE — 99396 PREV VISIT EST AGE 40-64: CPT | Performed by: OBSTETRICS & GYNECOLOGY

## 2024-11-04 PROCEDURE — 82947 ASSAY GLUCOSE BLOOD QUANT: CPT | Performed by: OBSTETRICS & GYNECOLOGY

## 2024-11-04 PROCEDURE — 80061 LIPID PANEL: CPT | Performed by: OBSTETRICS & GYNECOLOGY

## 2024-11-04 NOTE — PATIENT INSTRUCTIONS
Plan to stay off of the birth control pill.   If you are not in menopause, there is a chance of getting pregnant, so continue to use condoms.  If you have no bleeding for one year, then you are menopausal.    Reach out if you have heavy, prolonged or more frequent bleeding.  Also, reach out if you have any spotting or bleeding after going one year without a period.

## 2024-11-04 NOTE — PROGRESS NOTES
Physical      49 year old  presents for annual health maintenance exam today.    No history of GDM or GHTN.  I have seen the patient in the past for abnormal uterine bleeding. Family history of endometrial cancer (Mom, dx age 66).  Adenomyosis has been suggested on ultrasound.  She was started on the birth control pill 2022.  She was initially on Loestrin and changed to Sprintec 23  She stopped the pill in July.  LMP .  No bleeding since.     FIT 24: Negative    Hep C and HIV screening 23 NR  Lipids 23: Cholesterol 193, , HDL 71, LDL 83  A1c 23 = 5.3  Last mammogram 23 BIRADS 1    History of abnormal Pap smear: No - age 30- 64 PAP with HPV every 5 years recommended      Latest Ref Rng & Units 2019     1:45 PM 2019     1:36 PM   PAP / HPV   PAP (Historical)   NIL    HPV 16 DNA NEG^Negative Negative     HPV 18 DNA NEG^Negative Negative     Other HR HPV NEG^Negative Negative         No Known Allergies    No current outpatient medications on file.     No current facility-administered medications for this visit.       Patient Active Problem List   Diagnosis    Family history of malignant neoplasm of endometrium    Abnormal uterine bleeding (AUB)    Adenomyosis     Past Surgical History:   Procedure Laterality Date     SECTION         Social History     Tobacco Use    Smoking status: Never    Smokeless tobacco: Never   Substance Use Topics    Alcohol use: Not Currently     Family History   Problem Relation Age of Onset    Endometrial Cancer Mother     Hypertension Mother     Breast Cancer Maternal Grandmother             PHQ-2 Score:         11/3/2024    10:50 AM 2023     9:18 AM   PHQ-2 (  [employer])   Q1: Little interest or pleasure in doing things 0  0   Q2: Feeling down, depressed or hopeless 0  0   PHQ-2 Score 0  0   Q1: Little interest or pleasure in doing things Not at all    Q2: Feeling down, depressed or hopeless Not at all     PHQ-2 Score 0        Patient-reported        BP Readings from Last 3 Encounters:   08/14/23 136/72   11/14/22 125/84   09/24/21 116/69    Wt Readings from Last 3 Encounters:   08/14/23 68.6 kg (151 lb 3.2 oz)   11/14/22 67.1 kg (148 lb)   09/24/21 63.6 kg (140 lb 3.2 oz)         Recent Labs   Lab Test 11/14/22  1135 08/30/21  0906   *  --    HDL 78  --    TRIG 136  --    TSH  --  1.53        ROS:  Pertinent positives and negatives are listed in the HPI.     Physical Exam  There were no vitals filed for this visit.  There is no height or weight on file to calculate BMI.  Gen: Alert and oriented times 3, no acute distress.  Well developed, well nourished, pleasant.    Neck: Supple, no masses.  No thyromegaly.  Breast: Symmetrical without lesions.  No dimpling, nipple discharge, or discrete masses.  No lymphadenopathy.  Chest:  Non labored.  Clear to auscultation bilaterally.    Heart: Regular, normal S1, S2.  No murmurs.   Abdomen: Soft, nontender, nondistended.  No hepatosplenomegaly.    :  Normal female external genitalia.  No lesions.  Urethral meatus normal.  Speculum exam reveals a normal vaginal vault, normal cervix.  No abnormal discharge.  Bimanual exam reveals a normal, mobile, nontender uterus.  No cervical motion tenderness.  Adnexa nontender with no palpable masses.    Extremities:  Nontender, no edema.    Pap obtained:  Yes      Assessment/Plan:     ICD-10-CM    1. Well female exam with routine gynecological exam  Z01.419 Lipid panel reflex to direct LDL Fasting     Glucose      2. Encounter for screening for cervical cancer  Z12.4 HPV and Gynecologic Cytology Panel - Recommended Age 30-65 Years      3. Visit for screening mammogram  Z12.31 MA Screen Bilateral w/Humphrey      4. Colon cancer screening  Z12.11 Fecal colorectal cancer screen (FIT)           Bleeding - offered FSH today.  She prefers to watch.  She understands she could get pregnant if she is not in menopause and will continue to use  condoms until it has been at least a year without a period.  She will reach out if she has abnormal bleeding or postmenopausal bleeding.   Counselling and lifestyle modification recommendations:  continue regular weight bearing exercise, follow a low fat, low cholesterol diet, discussed calcium with Vit D, continue current healthy lifestyle patterns    Return in one year or as needed.        {Breast Cancer Risk Calculator  BRCA-Related Cancer Risk Assessment FHS-7 Tool :[id number]

## 2024-11-05 ENCOUNTER — PATIENT OUTREACH (OUTPATIENT)
Dept: CARE COORDINATION | Facility: CLINIC | Age: 49
End: 2024-11-05
Payer: COMMERCIAL

## 2024-11-07 LAB
BKR AP ASSOCIATED HPV REPORT: NORMAL
BKR LAB AP GYN ADEQUACY: NORMAL
BKR LAB AP GYN INTERPRETATION: NORMAL
BKR LAB AP LMP: NORMAL
BKR LAB AP PREVIOUS ABNORMAL: NORMAL
PATH REPORT.COMMENTS IMP SPEC: NORMAL
PATH REPORT.COMMENTS IMP SPEC: NORMAL
PATH REPORT.RELEVANT HX SPEC: NORMAL

## 2025-03-18 ENCOUNTER — ANCILLARY PROCEDURE (OUTPATIENT)
Dept: ULTRASOUND IMAGING | Facility: CLINIC | Age: 50
End: 2025-03-18
Attending: OBSTETRICS & GYNECOLOGY
Payer: COMMERCIAL

## 2025-03-18 DIAGNOSIS — N93.9 ABNORMAL UTERINE BLEEDING (AUB): ICD-10-CM

## 2025-03-18 PROCEDURE — 76830 TRANSVAGINAL US NON-OB: CPT | Performed by: STUDENT IN AN ORGANIZED HEALTH CARE EDUCATION/TRAINING PROGRAM

## 2025-03-18 PROCEDURE — 76856 US EXAM PELVIC COMPLETE: CPT | Performed by: STUDENT IN AN ORGANIZED HEALTH CARE EDUCATION/TRAINING PROGRAM
